# Patient Record
Sex: FEMALE | Race: BLACK OR AFRICAN AMERICAN | NOT HISPANIC OR LATINO | URBAN - METROPOLITAN AREA
[De-identification: names, ages, dates, MRNs, and addresses within clinical notes are randomized per-mention and may not be internally consistent; named-entity substitution may affect disease eponyms.]

---

## 2019-01-22 ENCOUNTER — EMERGENCY (EMERGENCY)
Facility: HOSPITAL | Age: 54
LOS: 0 days | Discharge: HOME | End: 2019-01-22
Attending: EMERGENCY MEDICINE
Payer: COMMERCIAL

## 2019-01-22 VITALS
HEIGHT: 60 IN | TEMPERATURE: 98 F | SYSTOLIC BLOOD PRESSURE: 130 MMHG | DIASTOLIC BLOOD PRESSURE: 90 MMHG | OXYGEN SATURATION: 100 % | HEART RATE: 97 BPM | WEIGHT: 145.06 LBS | RESPIRATION RATE: 17 BRPM

## 2019-01-22 DIAGNOSIS — D64.9 ANEMIA, UNSPECIFIED: ICD-10-CM

## 2019-01-22 DIAGNOSIS — H10.9 UNSPECIFIED CONJUNCTIVITIS: ICD-10-CM

## 2019-01-22 DIAGNOSIS — G43.909 MIGRAINE, UNSPECIFIED, NOT INTRACTABLE, WITHOUT STATUS MIGRAINOSUS: ICD-10-CM

## 2019-01-22 PROCEDURE — 99282 EMERGENCY DEPT VISIT SF MDM: CPT

## 2019-01-22 NOTE — ED PROVIDER NOTE - MEDICAL DECISION MAKING DETAILS
Patient presents with conjunctivitis following URI, will treat with supportive care for URI and erythromycin for conjunctivitis.

## 2019-01-22 NOTE — ED PROVIDER NOTE - ATTENDING CONTRIBUTION TO CARE
53 year old female c/o eye redness x few days. PE: · EYES: Clear bilaterally, pupils equal, round and reactive to light.20/20 visual acuity OU, R eye injected, + crusting, no orbital edema, erythema or tenderness, EOMI. I&P: conjunctivitis, abx, supportive care, PMD follow up

## 2019-01-22 NOTE — ED PROVIDER NOTE - OBJECTIVE STATEMENT
53F here with crusting and drainage from the right eye x 2 days, some green drainage. No change in vision. No trauma. Does not wear contact lenses. She reports initially had nasal congestion and nonproductive cough x 3 days, resolved x 2 days ago. No fever or chills.

## 2019-01-22 NOTE — ED PROVIDER NOTE - EYES, MLM
Clear bilaterally, pupils equal, round and reactive to light.20/20 visual acuity OU, R eye injected, + crusting, no orbital edema, erythema or tenderness, EOMI

## 2019-01-22 NOTE — ED PROVIDER NOTE - FAMILY HISTORY
Mother  Still living? Unknown  CAD (coronary artery disease), Age at diagnosis: Age Unknown     Father  Still living? Yes, Estimated age: Age Unknown  Family history of prostate cancer, Age at diagnosis: Age Unknown

## 2019-01-23 RX ORDER — ERYTHROMYCIN BASE 5 MG/GRAM
1 OINTMENT (GRAM) OPHTHALMIC (EYE)
Qty: 15 | Refills: 0 | OUTPATIENT
Start: 2019-01-23 | End: 2019-01-29